# Patient Record
(demographics unavailable — no encounter records)

---

## 2025-04-09 NOTE — PHYSICAL EXAM
[Chaperoned Physical Exam] : A chaperone was present in the examining room during all aspects of the physical examination. [Appropriately responsive] : appropriately responsive [Alert] : alert [No Acute Distress] : no acute distress [No Lymphadenopathy] : no lymphadenopathy [Regular Rate Rhythm] : regular rate rhythm [No Murmurs] : no murmurs [Clear to Auscultation B/L] : clear to auscultation bilaterally [Soft] : soft [Non-tender] : non-tender [Non-distended] : non-distended [No HSM] : No HSM [No Lesions] : no lesions [No Mass] : no mass [Oriented x3] : oriented x3 [Examination Of The Breasts] : a normal appearance [No Masses] : no breast masses were palpable [Labia Majora] : normal [Labia Minora] : normal [Normal] : normal [Uterine Adnexae] : normal [FreeTextEntry2] :  Kb Le  [FreeTextEntry1] : lis

## 2025-04-09 NOTE — PLAN
[FreeTextEntry1] : #HCM -Breast self exam reviewed and taught -Pelvic exam done today -STI Screening today with GC/CT cultures, HIV, RPR, Hep B/C  -Pap w/ GC/Chl conducted today  #Irregular menses: -Advised pt to schedule pelvic sonogram -d/w pt PCOS is dx'ed if she meets two of three of the following indications: ovarian follicles in sono, abnl PCOS labs, and/or PCOS sxs (irregular menses, acne, hirsutism, etc) -PCOS labs drawn today -d/w pt management of PCOS w/ diet and exercise, and/or w/ birth control   RTO for TVUS w/ MD visit to review PCOS labs, or MENG Chairez MD    I, Kb Le, am scribing for Dr. Chairez in the following sections: HISTORY OF PRESENT ILLNESS, PAST MEDICAL/FAMILY/SOCIAL HISTORY, REVIEW OF SYSTEMS, PHYSICAL EXAM, ASSESSMENT/PLAN.

## 2025-04-09 NOTE — HISTORY OF PRESENT ILLNESS
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   She reports irregular menses since 12/2024 - skipped 12/2024 (after traveling in 11/2024), and then irregular cycles of 42d, then 37d, then 18d.. She reports usually having monthly menses w/ up to 27d cycle prior to 12/2024. She denies changes in lifestyle or stress. She reports mild weight gain recently. She has not been sexually active since 9/2024.  No vaginal discharge or vaginitis symptoms. She denies abdominal or pelvic pain. No urinary complaints. BM is normal per patient.   OBHx: G0 GynHx: denies h/o OCP/IUD use. PMH: denies SHx: denies Meds: denies All: NKDA Soc: social alcohol. denies smoking, drugs, or tobacco use Psych: PHQ9 = 0 FHx: Mother w/ HLD, Father w/ pancreatitis

## 2025-05-01 NOTE — SIGNATURES
[TextEntry] : I attest that the scribe was present and chaperoned the entire encounter. I saw the patient, performed the examination, and reviewed and edited the note.

## 2025-05-01 NOTE — HISTORY OF PRESENT ILLNESS
[FreeTextEntry1] : 24 year old DIANA LANGE pt presents for TVUS and to fu on PCOS labs.  Labs 4/2025: DHEAS elevated to 472, Testosterone elevated to 67.5.  She reports irregular menses in general since 12/2024. Her cycles were as long as 37d and 42d. She had regular menses for her LMP. Denies vaginal dischare or vaginitis sxs.  Pelvic sono today - anteverted uterus, endometrium 3.06mm, b/l ovaries seen w/ multiple small scattered follicles, nml otherwise.

## 2025-05-01 NOTE — PHYSICAL EXAM
[Appropriately responsive] : appropriately responsive [Alert] : alert [FreeTextEntry2] : Kahlil Plummer [FreeTextEntry1] : medical scribe

## 2025-05-01 NOTE — END OF VISIT
[FreeTextEntry3] : This note was written by Kahlil Plummer on 05/01/2025 actively solely MARCIAL Harris M.D. All medical record entries made by the Scribe were at my, MARCIAL Harris M.D. direction and personally dictated by me on 05/01/2025. I have personally reviewed the chart and agree that the record reflects my personal performance of the history, physical exam, assessment, and plan.

## 2025-05-01 NOTE — PLAN
[FreeTextEntry1] : #PCOS dx -Meets 3 of the 3 [place] criteria for PCOS -C/w healthy diet and exercise -Denies smoking and overall tobacco use -Denies FHx of blood clotting disorders -Recommend OCP Valorie, pt does not desire pill -Discussed risks of missing period -Pt to consider OCP Valorie and call if she'd like  RTO or Telemed in 3 mos for PCOS fu and in 11 mo for annual RObdulia Chairez MD